# Patient Record
Sex: FEMALE | Race: WHITE | URBAN - METROPOLITAN AREA
[De-identification: names, ages, dates, MRNs, and addresses within clinical notes are randomized per-mention and may not be internally consistent; named-entity substitution may affect disease eponyms.]

---

## 2020-06-03 ENCOUNTER — NEW PATIENT (OUTPATIENT)
Dept: URBAN - METROPOLITAN AREA CLINIC 51 | Facility: CLINIC | Age: 55
End: 2020-06-03
Payer: COMMERCIAL

## 2020-06-03 DIAGNOSIS — E11.9 TYPE 2 DIABETES MELLITUS WITHOUT COMPLICATIONS: Primary | ICD-10-CM

## 2020-06-03 DIAGNOSIS — H52.4 PRESBYOPIA: ICD-10-CM

## 2020-06-03 DIAGNOSIS — Z79.84 LONG TERM (CURRENT) USE OF ORAL ANTIDIABETIC DRUGS: ICD-10-CM

## 2020-06-03 PROCEDURE — 92015 DETERMINE REFRACTIVE STATE: CPT | Performed by: OPTOMETRIST

## 2020-06-03 PROCEDURE — 92250 FUNDUS PHOTOGRAPHY W/I&R: CPT | Performed by: OPTOMETRIST

## 2020-06-03 PROCEDURE — 92004 COMPRE OPH EXAM NEW PT 1/>: CPT | Performed by: OPTOMETRIST

## 2020-06-03 ASSESSMENT — KERATOMETRY
OD: 44.85
OS: 45.19

## 2020-06-03 ASSESSMENT — VISUAL ACUITY
OS: 20/25
OD: 20/25

## 2020-06-03 ASSESSMENT — INTRAOCULAR PRESSURE
OS: 14
OD: 16

## 2022-08-31 ENCOUNTER — OFFICE VISIT (OUTPATIENT)
Dept: URBAN - METROPOLITAN AREA CLINIC 51 | Facility: CLINIC | Age: 57
End: 2022-08-31
Payer: COMMERCIAL

## 2022-08-31 DIAGNOSIS — Z79.84 LONG TERM (CURRENT) USE OF ORAL HYPOGLYCEMIC DRUGS: ICD-10-CM

## 2022-08-31 DIAGNOSIS — H35.373 PUCKERING OF MACULA, BILATERAL: ICD-10-CM

## 2022-08-31 DIAGNOSIS — H35.013 CHANGES IN RETINAL VASCULAR APPEARANCE, BILATERAL: ICD-10-CM

## 2022-08-31 DIAGNOSIS — H10.45 OTHER CHRONIC ALLERGIC CONJUNCTIVITIS: ICD-10-CM

## 2022-08-31 DIAGNOSIS — E11.9 TYPE 2 DIABETES MELLITUS WITHOUT COMPLICATIONS: Primary | ICD-10-CM

## 2022-08-31 DIAGNOSIS — H01.001 UNSPECIFIED BLEPARITIS OF RIGHT UPPER EYELID: ICD-10-CM

## 2022-08-31 DIAGNOSIS — H52.4 PRESBYOPIA: ICD-10-CM

## 2022-08-31 DIAGNOSIS — H01.004 UNSPECIFIED BLEPHARITIS OF LEFT UPPER EYELID: ICD-10-CM

## 2022-08-31 PROCEDURE — 92134 CPTRZ OPH DX IMG PST SGM RTA: CPT | Performed by: OPTOMETRIST

## 2022-08-31 PROCEDURE — 92014 COMPRE OPH EXAM EST PT 1/>: CPT | Performed by: OPTOMETRIST

## 2022-08-31 PROCEDURE — 92250 FUNDUS PHOTOGRAPHY W/I&R: CPT | Performed by: OPTOMETRIST

## 2022-08-31 ASSESSMENT — VISUAL ACUITY
OD: 20/25
OS: 20/25

## 2022-08-31 ASSESSMENT — INTRAOCULAR PRESSURE
OD: 18
OS: 18

## 2022-08-31 NOTE — IMPRESSION/PLAN
Impression: Type 2 diabetes mellitus without complications: C20.7. Plan: No Non-Proliferative Diabetic Retinopathy, no Diabetic Macular Edema and no Neovascularization of the iris, disc, or elsewhere. Discussed ocular and systemic benefits of blood sugar control. Send notes to PCP. Check annually.

## 2022-08-31 NOTE — IMPRESSION/PLAN
Impression: Other chronic allergic conjunctivitis: H10.45. Plan: wash face , lids and brows
cool compress 
preservative free AT Do not rub eyes Pataday, zaditor or alaway otc
handout provided

## 2022-08-31 NOTE — IMPRESSION/PLAN
Impression: Unspecified bleparitis of right upper eyelid: H01.001. Plan: Recommend use:
Lipid based Artificial tears QID OU Cool compresses BID Allergy Drops PRN Eyelid scrubs/cleanser 2-3 times per week
> 600 mg Omega 3 per day Hydrate / Add humidifier / Avoid direct air flow / Blink fully Handout provided

## 2022-08-31 NOTE — IMPRESSION/PLAN
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  Plan: photo documented today